# Patient Record
Sex: FEMALE | Race: WHITE
[De-identification: names, ages, dates, MRNs, and addresses within clinical notes are randomized per-mention and may not be internally consistent; named-entity substitution may affect disease eponyms.]

---

## 2017-02-22 NOTE — P.HPOB
History of Present Illness


H&P Date: 17


Chief Complaint: Requesting permanent sterilization.





This patient is a pleasant 36-year-old  4 para 2 female who is 

requesting laparoscopic tubal cauterization for permanent sterilization.  In 

patients previous marriage her  had a vasectomy, she offers subsequently 

has gotten  and now requests permanent sterilization.  Patient and I 

have discussed options for birth control and this is the method she has chosen.

  Patient is also had an endometrial ablation in the past and is of course and 

imperative that she does not get pregnant.





Review of Systems


Constitutional: Denies chills, Denies fever


Ears, nose, mouth and throat: Denies headache, Denies sore throat


Cardiovascular: Denies chest pain, Denies shortness of breath


Respiratory: Denies cough


Gastrointestinal: Denies abdominal pain, Denies diarrhea, Denies nausea, Denies 

vomiting


Genitourinary: Denies dysuria, Denies hematuria


Menstruation: Reports period light


Musculoskeletal: Denies myalgias





Past Medical History


Past Medical History: No Reported History


Additional Past Medical History / Comment(s): migraines,


History of Any Multi-Drug Resistant Organisms: None Reported


Past Surgical History: Appendectomy, Uterine Ablation


Additional Past Surgical History / Comment(s): Patient is had a LEEP done and 2 

D&Cs.


Past Anesthesia/Blood Transfusion Reactions: No Reported Reaction


Past Psychological History: No Psychological Hx Reported


Smoking Status: Current every day smoker


Past Alcohol Use History: Occasional


Additional Past Alcohol Use History / Comment(s): smokes 1/2 PPD for 15 yrs on 

and off


Past Drug Use History: None Reported





- Past Family History


  ** Mother


Family Medical History: No Reported History





Medications and Allergies


 Home Medications











 Medication  Instructions  Recorded  Confirmed  Type


 


Acetaminophen-Codeine 300-30mg 1 tab PO DAILY PRN 16 History





[Tylenol w/codeine #3]    


 


ALPRAZolam [Xanax] 1 mg PO TID 17 History


 


Dextroamphetamine/Amphetamine 20 mg PO DAILY 17 History





[Adderall]    











 Allergies











Allergy/AdvReac Type Severity Reaction Status Date / Time


 


No Known Allergies Allergy   Verified 17 15:19














Exam





- OBG Physical Exam


Abdomen: bowel sounds normal, no diffuse tenderness, no bruit present, no 

guarding noted, no hepatomegaly, no splenomegaly, no mass


Vulva: both: normal


Vagina: normal moisture, no discharge


Cervix: no lesion, no discharge


Uterus: normal size, normal contour


Adnexa: both: normal





Assessment and Plan


(1) Family planning


Narrative/Plan: 


This is a pleasant 36-year-old  4 para 2 female who is requesting 

laparoscopic bilateral fallopian tube cauterization for permanent 

sterilization.  Patient and I have discussed options for birth control and this 

is the method she has chosen.  She understands that it is considered permanent 

however there is a failure rate of approximately 20-25 per thousand procedures 

done.  She understands if she does become pregnant she has a 50% chance of a 

tubal or an ectopic pregnancy.  She also understands this as elective surgery 

and surgery itself has risks including risks of infection, bleeding, possible 

injury to bowel, bladder, vessels, and other organs.  All the patient's 

questions are answered and a written consent is obtained.  Plan is laparoscopic 

bilateral fallopian tube cauterization.


Status: Acute

## 2017-02-23 ENCOUNTER — HOSPITAL ENCOUNTER (OUTPATIENT)
Dept: HOSPITAL 47 - OR | Age: 37
Discharge: HOME | End: 2017-02-23
Payer: COMMERCIAL

## 2017-02-23 VITALS — BODY MASS INDEX: 24 KG/M2

## 2017-02-23 VITALS — TEMPERATURE: 99.2 F

## 2017-02-23 VITALS — RESPIRATION RATE: 18 BRPM

## 2017-02-23 VITALS — DIASTOLIC BLOOD PRESSURE: 82 MMHG | HEART RATE: 78 BPM | SYSTOLIC BLOOD PRESSURE: 116 MMHG

## 2017-02-23 DIAGNOSIS — Z30.2: Primary | ICD-10-CM

## 2017-02-23 DIAGNOSIS — F17.200: ICD-10-CM

## 2017-02-23 DIAGNOSIS — Z79.899: ICD-10-CM

## 2017-02-23 PROCEDURE — 58670 LAPAROSCOPY TUBAL CAUTERY: CPT

## 2017-02-23 PROCEDURE — 81025 URINE PREGNANCY TEST: CPT

## 2017-02-23 NOTE — P.OP
Date of Procedure: 17


Preoperative Diagnosis: 


Multi parity desires permanent sterilization.


Postoperative Diagnosis: 


Same


Procedure(s) Performed: 


Laparoscopic bilateral fallopian tube cauterization


Anesthesia: JORIA


Surgeon: Aureliano Yang


Estimated Blood Loss (ml): 10


Pathology: none sent


Condition: stable


Disposition: PACU


Indications for Procedure: 


Please see dictated H&P for intimate details of this patient's admission.  

Brief summary is a pleasant 36-year-old  4 para 2 female who is 

requesting laparoscopic tubal cauterization for permanent sterilization.  

Patient does understand this is considered a permanent procedure, however there 

is a failure rate of approximately 20-25 per thousand procedures done.  She 

understands if she were to become pregnant she has a 50% chance of a tubal or 

an ectopic pregnancy.  Patient also understands that laparoscopic surgery and 

apparently has risks including risks of infection, bleeding, possible injury 

bowel, bladder, vessels, and/or other organs.


Description of Procedure: 


This patient is taken to the operating room where she is laid in the supine 

position.  She subsequently undergoes general endotracheal anesthesia without 

incident.  With an adequate level of anesthesia was placed in dorsal lithotomy 

position.  His vaginal perineal abdominal prep and drape.  I first good on 

below placed a speculum into the vagina visualizing the cervix.  An Allis clamp 

was placed on the anterior lip of the cervix and a large acorn cannula is 

placed into the endocervix and placed to the Allis clamp.  A red Tripp is 

then placed in the bladder for drainage.  I then changed gloves and go up 

above.  I make a 1 cm infraumbilical incision through her previous appendectomy 

scar.  Through this I placed a 10 mm bladed optical trocar.  Peritoneal 

placement is confirmed and a pneumoperitoneum is created to 12 mm of carbon 

dioxide gas.  Patient placed in Trendelenburg.  Approximately 2 finger breaths 

above the symphysis pubis I make a 5 mm incision and a 5 mm bladed lists 

trochars placed under direct visualization.  Using a blunt probe I then 

visualize uterus tubes and ovary and all appears normal.  I then take a bipolar 

device grabbed the left fallopian tube approximately 4 cm from its cornual 

insertion and cauterize a 2-3 cm segment of tube.  Complete cauterization is 

noted by the volt meter.  Similar technique on the right side is done with 

similar results.  With this completed the procedure is then ended.  The lower 

trochars removed and good hemostasis is noted the pneumoperitoneum was reduced 

and the upper trochars removed.  Incisions are closed using 4-0 Vicryl 

interrupted fashion.  Steri-Strips and sterile dressing is applied.  I 

infiltrate the incisions with half percent Marcaine.


Go down below remove the Allis acorn cannula and speculum.  All counts are 

correct 3.  There are no complications.  Patient's taken recovery room in 

satisfactory condition.

## 2018-12-20 ENCOUNTER — HOSPITAL ENCOUNTER (EMERGENCY)
Dept: HOSPITAL 47 - EC | Age: 38
Discharge: HOME | End: 2018-12-20
Payer: COMMERCIAL

## 2018-12-20 VITALS — RESPIRATION RATE: 16 BRPM | TEMPERATURE: 98 F

## 2018-12-20 VITALS — DIASTOLIC BLOOD PRESSURE: 88 MMHG | SYSTOLIC BLOOD PRESSURE: 126 MMHG | HEART RATE: 114 BPM

## 2018-12-20 DIAGNOSIS — Z79.899: ICD-10-CM

## 2018-12-20 DIAGNOSIS — F17.200: ICD-10-CM

## 2018-12-20 DIAGNOSIS — B34.9: Primary | ICD-10-CM

## 2018-12-20 PROCEDURE — 99283 EMERGENCY DEPT VISIT LOW MDM: CPT

## 2018-12-20 NOTE — ED
General Adult HPI





- General


Chief complaint: Upper Respiratory Infection


Stated complaint: Poss flu


Time Seen by Provider: 12/20/18 02:48


Source: patient


Limitations: no limitations





- History of Present Illness


Initial comments: 


Priya is a previously healthy 38-year-old female who presents the emergency 

department today because she needs a work note.  Patient reports she believed 

she's been suffering from influenza.  Patient works as a medical assistant and 

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 

influenza.  Patient reports she began feeling unwell on Saturday the week she 

progressively worsened, patient reports she's had fevers, chills, body aches 

and upper respiratory type symptoms.  She's been treating herself with 

supportive care and plenty of sleep.  She's been unable to attend work this 

week.  She states that her employer advised her that she needed to have a doctor

's note by the beginning of work tomorrow morning.


States she's began to feel better this evening and believe she is now on the 

upswing, she states she didn't go to her primary care physician because she was 

sleeping all day but decided to come to the ER now that she was feeling better 

so that she can get a work note.  Patient states she doesn't feel that she 

needs any further workup, she's confident that she has a viral illness does not 

require any antibiotics and we'll continue supportive care at home.











- Related Data


 Home Medications











 Medication  Instructions  Recorded  Confirmed


 


ALPRAZolam [Xanax] 1 mg PO TID 02/21/17 12/20/18


 


Dextroamphetamine/Amphetamine 20 mg PO DAILY 02/21/17 12/20/18





[Adderall]   











 Allergies











Allergy/AdvReac Type Severity Reaction Status Date / Time


 


No Known Allergies Allergy   Verified 02/23/17 06:22














Review of Systems


ROS Statement: 


Those systems with pertinent positive or pertinent negative responses have been 

documented in the HPI.





ROS Other: All systems not noted in ROS Statement are negative.





Past Medical History


Past Medical History: No Reported History


Additional Past Medical History / Comment(s): migraines,


History of Any Multi-Drug Resistant Organisms: None Reported


Past Surgical History: Appendectomy, Uterine Ablation


Additional Past Surgical History / Comment(s): Patient is had a LEEP done and 2 

D&Cs.


Past Anesthesia/Blood Transfusion Reactions: No Reported Reaction


Past Psychological History: No Psychological Hx Reported


Smoking Status: Current every day smoker


Past Alcohol Use History: Occasional


Past Drug Use History: None Reported





- Past Family History


  ** Mother


Family Medical History: No Reported History





General Exam





- General Exam Comments


Initial Comments: 


Physical Exam


GENERAL:


Patient is well-developed and well-nourished.  Patient is nontoxic and well-

hydrated and is in no distress.





HENT:


Normocephalic, Atraumatic. 





EYES:


PERRL, EOMI





PULMONARY:


Unlabored respirations.  No audible rales rhonchi or wheezing was noted.





CARDIOVASCULAR:


There is a regular rate and rhythm without any murmurs gallops or rubs.  





ABDOMEN:


Soft and nontender with normal bowel sounds. 





SKIN:


Warm to the touch, Skin is clear with no lesions or rashes and otherwise 

unremarkable.





: 


Deferred





NEUROLOGIC:


Patient is alert and oriented x3.  Moving all extremities spontaneously





MUSCULOSKELETAL:


Normal extremities with adequate strength and full range of motion.  No lower 

extremity swelling or edema.  No calf tenderness.  





PSYCHIATRIC:


Normal psychiatric evaluation.  





Limitations: no limitations





Limitations: no limitations





Course


 Vital Signs











  12/20/18





  02:00


 


Temperature 98.1 F


 


Pulse Rate 114 H


 


Respiratory 20





Rate 


 


Blood Pressure 126/88


 


O2 Sat by Pulse 99





Oximetry 














Medical Decision Making





- Medical Decision Making


The patient was seen and evaluated, history obtained from the patient, patient 

has history and physical exam consistent with viral illness


I offered to obtain labs and chest x-ray however patient doesn't feel this is 

necessary.  She does feel she is improving.  At this time the patient would 

like a work note excusing her from work through later today.  Patient would 

like to return to work on Friday, December 21.





A work note was provided shins pertaining care were answered, return parameters 

were discussed the patient was discharged home in stable condition.











Disposition


Clinical Impression: 


 Viral infection





Disposition: HOME SELF-CARE


Instructions:  Upper Respiratory Infection (ED)


Is patient prescribed a controlled substance at d/c from ED?: No


Referrals: 


Jerry Gonzales MD [Primary Care Provider] - 1-2 days


Time of Disposition: 03:03